# Patient Record
(demographics unavailable — no encounter records)

---

## 2025-02-09 NOTE — HISTORY OF PRESENT ILLNESS
[FreeTextEntry1] : 67 y/o F for nephrolithiasis follow up  History of uric acid stones  Last CT May 2024-22 mm right upper pole calculus   Was planning for Right PCNL after discussion in June 2024 Here now for follow up as she is interested in scheduling the surgery  Pt had bilateral flank pain recently, mild gross hematuria, passed 4 small stones as recent as yesterday. Pt still has mild right flank pain. Pt brought stones to office today. Denies fever, chills.   plan: - urine culture - CT - will call and update - continue with K-citrate - plan for surgery  - stone analysis

## 2025-02-09 NOTE — ASSESSMENT
[FreeTextEntry1] :  PCNL Procedure, in hospital stay and recovery discussed with the patient. Potential complications of bleeding, transfusion, selective angioembolization if indicated, adjacent organ injury, fever, sepsis, infection, incomplete stone clearance, bowel or other unusual injury. chest complications, vascular injuries, procedures needed to address any complications, ureteral injury, anesthetic complications, all discussed.  Printed information including all of the above and more provided to the patient.   plan: - urine culture - CT - will call and update - continue with K-citrate - plan for surgery  - stone analysis

## 2025-02-09 NOTE — HISTORY OF PRESENT ILLNESS
[FreeTextEntry1] : 65 y/o F for nephrolithiasis follow up  History of uric acid stones  Last CT May 2024-22 mm right upper pole calculus   Was planning for Right PCNL after discussion in June 2024 Here now for follow up as she is interested in scheduling the surgery  Pt had bilateral flank pain recently, mild gross hematuria, passed 4 small stones as recent as yesterday. Pt still has mild right flank pain. Pt brought stones to office today. Denies fever, chills.   plan: - urine culture - CT - will call and update - continue with K-citrate - plan for surgery  - stone analysis

## 2025-03-14 NOTE — HISTORY OF PRESENT ILLNESS
[FreeTextEntry1] : 67 yo F with nephrolithiasis  s/p mini-PCNL cystoscopy and stent removal today still no stone analysis  plan: - fluid intake - follow up in 1 month with US and 24 hr

## 2025-04-16 NOTE — ASSESSMENT
[FreeTextEntry1] : Plan -Increase water intake to 70-80 oz -Restart K Cit 10 mEq 2 tab BID  -3 month follow up US, new 24 hr, BMP

## 2025-04-16 NOTE — HISTORY OF PRESENT ILLNESS
[FreeTextEntry1] : 65 y/o F for nephrolithiasis follow up  Prior [facility] 2014 Right mini PCNL 3/7/25 Stent removed later   Reviewed US-No calculi or hydronephrosis in right kidney. Mild left hydronephrosis. Non obstructing left intrarenal calculi 6mm + 9 mm Prior CT Jan '25 with only punctate stones in left   Reviewed 24 hr-Vol 1.9, Good Ca, low Ox, Low citrate, pH 5.2, High SS UA, Normal UA, Good Na Pt has not been on K citrate for several months due to pharmacy issues   Plan -Increase water intake to 70-80 oz -Restart K Cit 10 mEq 2 tab BID  -3 month follow up US, new 24 hr, BMP

## 2025-07-30 NOTE — HISTORY OF PRESENT ILLNESS
[FreeTextEntry1] : 66 y/o F for kidney stone follow up   Had episode of hyperkalemia  Seen in ER, repeat K 5.0  Reviewed US: Stable small stones in left kidney. Right kidney stone free. No hydronephrosis  Reviewed 24 hr-Good vol, good Ca, good Ox, pH 5.2, High SS UA, normal UA  Plan -Stop K citrate  -Start sodium bicarb two tablet BID  -2 month follow up with new 24 hr and new BMP